# Patient Record
Sex: FEMALE | Race: WHITE | NOT HISPANIC OR LATINO
[De-identification: names, ages, dates, MRNs, and addresses within clinical notes are randomized per-mention and may not be internally consistent; named-entity substitution may affect disease eponyms.]

---

## 2024-07-26 ENCOUNTER — TRANSCRIPTION ENCOUNTER (OUTPATIENT)
Age: 64
End: 2024-07-26

## 2024-07-26 ENCOUNTER — APPOINTMENT (OUTPATIENT)
Dept: ORTHOPEDIC SURGERY | Facility: CLINIC | Age: 64
End: 2024-07-26
Payer: COMMERCIAL

## 2024-07-26 VITALS
SYSTOLIC BLOOD PRESSURE: 136 MMHG | BODY MASS INDEX: 38.98 KG/M2 | HEART RATE: 85 BPM | DIASTOLIC BLOOD PRESSURE: 77 MMHG | WEIGHT: 220 LBS | HEIGHT: 63 IN

## 2024-07-26 DIAGNOSIS — M47.816 SPONDYLOSIS W/OUT MYELOPATHY OR RADICULOPATHY, LUMBAR REGION: ICD-10-CM

## 2024-07-26 DIAGNOSIS — M23.92 UNSPECIFIED INTERNAL DERANGEMENT OF LEFT KNEE: ICD-10-CM

## 2024-07-26 DIAGNOSIS — Z78.9 OTHER SPECIFIED HEALTH STATUS: ICD-10-CM

## 2024-07-26 PROBLEM — Z00.00 ENCOUNTER FOR PREVENTIVE HEALTH EXAMINATION: Status: ACTIVE | Noted: 2024-07-26

## 2024-07-26 PROCEDURE — 99204 OFFICE O/P NEW MOD 45 MIN: CPT | Mod: 25

## 2024-07-26 PROCEDURE — 72100 X-RAY EXAM L-S SPINE 2/3 VWS: CPT

## 2024-07-26 RX ORDER — ATORVASTATIN CALCIUM 20 MG/1
20 TABLET, FILM COATED ORAL
Refills: 0 | Status: ACTIVE | COMMUNITY

## 2024-07-26 RX ORDER — LISINOPRIL 5 MG/1
5 TABLET ORAL
Refills: 0 | Status: ACTIVE | COMMUNITY

## 2024-07-26 NOTE — DISCUSSION/SUMMARY
[de-identified] : Very pleasant 64-year-old female with approximately 10 months of left posterior knee pain she can directly pinpoint her pain to the distal hamstring she has been to 2 previous knee specialist who states that this is her back that is causing this pain and discomfort she has failed a course of physical therapy Celebrex oral steroids and a cortisone injection in the knee she presented with an MRI today that shows absolutely no evidence of lumbar stenosis lateral recess or severe foraminal encroachment and also clinically I do not think this is a radicular component I think this is a muscle musculotendinous issue of the distal hamstring and/or proximal gastrocsoleus complex she also states she has a history of being severe degenerative joint disease of the knee so I think this is more of a combination of internal derangement muscle tendon type issues versus a neurologic spine issue based upon 10 months of worsening knee pain and no further care by this to knee specialist knee MRI scan to be ordered.  Patient will follow-up for further clinical correlation.  At next clinical assessment would like to obtain a left knee x-ray

## 2024-07-26 NOTE — HISTORY OF PRESENT ILLNESS
[de-identified] : Very pleasant 64-year-old female presents for spine surgery evaluation.  She has been to 2 orthopedic surgeons knee specialist that are telling her that her focal posterior right knee pain is coming from her back and not her knee.  She presents with a lumbar MRI for review.  She states she has a history of knee degenerative joint disease bone-on-bone this has been going on approximately 10 months no trauma she has had physical therapy oral steroids anti-inflammatories as well as drawl Dosepak.  She isolates the pain primarily to the lateral distal hamstring insertion/proximal gastrocsoleus insertion.  Also to the middle aspect of her knee as well. [Worsening] : worsening [___ mths] : [unfilled] month(s) ago [3] : an average pain level of 3/10 [0] : a minimum pain level of 0/10 [10] : a maximum pain level of 10/10 [Intermit.] : ~He/She~ states the symptoms seem to be intermittent [Standing] : worsened by standing [Walking] : worsened by walking [Weight Bearing] : worsened by weight bearing [NSAIDs] : relieved by nonsteroidal anti-inflammatory drugs [Physical Therapy] : relieved by physical therapy [Rest] : relieved by rest [Ataxia] : no ataxia [Incontinence] : no incontinence [Loss of Dexterity] : good dexterity [Urinary Ret.] : no urinary retention

## 2024-07-26 NOTE — PHYSICAL EXAM
[Antalgic] : antalgic [de-identified] : CONSTITUTIONAL: The patient is a very pleasant individual who is well-nourished and who appears stated age. PSYCHIATRIC: The patient is alert and oriented X 3 and in no apparent distress, and participates with orthopedic evaluation well. HEAD: Atraumatic and is nonsyndromic in appearance. EENT: No visible thyromegaly, EOMI. RESPIRATORY: Respiratory rate is regular, not dyspneic on examination. LYMPHATICS: There is no inguinal lymphadenopathy INTEGUMENTARY: Skin is clean, dry, and intact about the bilateral lower extremities and lumbar spine. VASCULAR: There is brisk capillary refill about the bilateral lower extremities. NEUROLOGIC: There are no pathologic reflexes. There is no decrease in sensation of the bilateral lower extremities on manual  examination. Deep tendon reflexes are well maintained at 2+/4 of the bilateral lower extremities and are symmetric.. MUSCULOSKELETAL: There is no visible muscular atrophy. Manual motor strength is well maintained in the bilateral lower extremities. Range of motion of lumbar spine is well maintained. The patient ambulates in a non-myelopathic manner. Negative tension sign and straight leg raise bilaterally. Quad extension, ankle dorsiflexion, EHL, plantar flexion, and ankle eversion are well preserved. Normal secondary orthopaedic exam of bilateral hips, greater trochanteric area, right knee and ankles.  Left knee demonstrates internal derangement characteristics consistent with meniscal pathology there is a mild joint effusion and there is reproducible pain with palpation to the lateral more so than the medial distal gastrocsoleus complex as well as distal hamstring. [de-identified] : X-rays of the lumbar spine have been reviewed on today's date shows well-maintained lordosis to spaces are maintained no acute osseous abnormalities.  MRI of the lumbar spine from [facility] dated June 2024 shows very mild lumbar spondylosis no central lateral recess or foraminal stenosis

## 2024-08-16 ENCOUNTER — APPOINTMENT (OUTPATIENT)
Dept: ORTHOPEDIC SURGERY | Facility: CLINIC | Age: 64
End: 2024-08-16
Payer: COMMERCIAL

## 2024-08-16 VITALS
HEIGHT: 63 IN | WEIGHT: 214 LBS | DIASTOLIC BLOOD PRESSURE: 76 MMHG | SYSTOLIC BLOOD PRESSURE: 124 MMHG | BODY MASS INDEX: 37.92 KG/M2 | HEART RATE: 83 BPM

## 2024-08-16 DIAGNOSIS — M23.92 UNSPECIFIED INTERNAL DERANGEMENT OF LEFT KNEE: ICD-10-CM

## 2024-08-16 DIAGNOSIS — M47.816 SPONDYLOSIS W/OUT MYELOPATHY OR RADICULOPATHY, LUMBAR REGION: ICD-10-CM

## 2024-08-16 PROCEDURE — 73560 X-RAY EXAM OF KNEE 1 OR 2: CPT | Mod: LT

## 2024-08-16 PROCEDURE — 99214 OFFICE O/P EST MOD 30 MIN: CPT | Mod: 25

## 2024-08-17 NOTE — HISTORY OF PRESENT ILLNESS
[de-identified] : 64-year-old female is here for follow-up of low back pain, posterior right knee pain.She states she has a history of knee degenerative joint disease bone-on-bone this has been going on approximately 10 months no trauma she has had physical therapy oral steroids anti-inflammatories as well as drawl Dosepak. She isolates the pain primarily to the lateral distal hamstring insertion/proximal gastrocsoleus insertion. Also to the middle aspect of her knee as well.Modifying factors - worsened by standing, worsened by walking and worsened by weight bearing. Relieving factors include relieved by nonsteroidal anti-inflammatory drugs, relieved by physical therapy and relieved by rest.   Associated Symptoms: no ataxia, no incontinence, good dexterity and no urinary retention.   [Worsening] : worsening [___ yrs] : [unfilled] year(s) ago [10] : a maximum pain level of 10/10 [Standing] : standing [Walking] : worsened by walking [Weight Bearing] : worsened by weight bearing [Rest] : relieved by rest [Ataxia] : no ataxia [Loss of Dexterity] : good dexterity [Urinary Ret.] : no urinary retention

## 2024-08-17 NOTE — DISCUSSION/SUMMARY
[de-identified] : Patient presents for review of the knee MRI and a knee x-ray that shows I believe moderate to severe knee degenerative joint disease.  With her clinical exam that shows pain posteriorly to the knee as well as the distal one third of the hamstring I think this is more of knee degenerative joint disease combined with internal derangements of the meniscus and ACL and I believe arthritic knee does develop some aspect of a contracture I think this contracture set her up for hamstring contracture as well and possible distal hamstring sprain and strain I think following up with our knee service here at [facility] would be ideal because I do think she is clarence benefit from a total knee replacement.  I very reviewed her MRI that does show mild L4-5 lateral recess stenosis she is can be referred to physical medicine and rehabilitation for consideration of an injection.  I think the knee is more of an issue at this point in time she will be followed up on intermittent basis to monitor her spine component.

## 2024-08-17 NOTE — PHYSICAL EXAM
[Antalgic] : antalgic [de-identified] : CONSTITUTIONAL: The patient is a very pleasant individual who is well-nourished and who appears stated age. PSYCHIATRIC: The patient is alert and oriented X 3 and in no apparent distress, and participates with orthopedic evaluation well. HEAD: Atraumatic and is nonsyndromic in appearance. EENT: No visible thyromegaly, EOMI. RESPIRATORY: Respiratory rate is regular, not dyspneic on examination. LYMPHATICS: There is no inguinal lymphadenopathy INTEGUMENTARY: Skin is clean, dry, and intact about the bilateral lower extremities and lumbar spine. VASCULAR: There is brisk capillary refill about the bilateral lower extremities. NEUROLOGIC: There are no pathologic reflexes. There is no decrease in sensation of the bilateral lower extremities on manual  examination. Deep tendon reflexes are well maintained at 2+/4 of the bilateral lower extremities and are symmetric.. MUSCULOSKELETAL: There is no visible muscular atrophy. Manual motor strength is well maintained in the bilateral lower extremities. Range of motion of lumbar spine is well maintained. The patient ambulates in a non-myelopathic manner. Negative tension sign and straight leg raise bilaterally. Quad extension, ankle dorsiflexion, EHL, plantar flexion, and ankle eversion are well preserved. Normal secondary orthopaedic exam of bilateral hips, greater trochanteric area,right knee and ankles.  Left knee showing internal derangement characteristics associated with soft tissue as well as bone pathology/degenerative joint disease [de-identified] : X-rays from today's date August 16, 2024 of the left knee shows severe knee degenerative joint disease. MRI of the left knee has been reviewed showing moderate to severe degenerative joint disease partially see tear ACL tearing soft tissue components consistent with internal derangement/arthritic changes.  Previous lumbar MRI has been reviewed showing age-appropriate lumbar spondylosis no significant stenosis is noted.  Previous lumbar MRI from [facility] has been reviewed again showing no significant central lateral recess or foraminal stenosis

## 2024-08-17 NOTE — DISCUSSION/SUMMARY
[de-identified] : Patient presents for review of the knee MRI and a knee x-ray that shows I believe moderate to severe knee degenerative joint disease.  With her clinical exam that shows pain posteriorly to the knee as well as the distal one third of the hamstring I think this is more of knee degenerative joint disease combined with internal derangements of the meniscus and ACL and I believe arthritic knee does develop some aspect of a contracture I think this contracture set her up for hamstring contracture as well and possible distal hamstring sprain and strain I think following up with our knee service here at [facility] would be ideal because I do think she is clarence benefit from a total knee replacement.  I very reviewed her MRI that does show mild L4-5 lateral recess stenosis she is can be referred to physical medicine and rehabilitation for consideration of an injection.  I think the knee is more of an issue at this point in time she will be followed up on intermittent basis to monitor her spine component.

## 2024-08-17 NOTE — HISTORY OF PRESENT ILLNESS
[de-identified] : 64-year-old female is here for follow-up of low back pain, posterior right knee pain.She states she has a history of knee degenerative joint disease bone-on-bone this has been going on approximately 10 months no trauma she has had physical therapy oral steroids anti-inflammatories as well as drawl Dosepak. She isolates the pain primarily to the lateral distal hamstring insertion/proximal gastrocsoleus insertion. Also to the middle aspect of her knee as well.Modifying factors - worsened by standing, worsened by walking and worsened by weight bearing. Relieving factors include relieved by nonsteroidal anti-inflammatory drugs, relieved by physical therapy and relieved by rest.   Associated Symptoms: no ataxia, no incontinence, good dexterity and no urinary retention.   [Worsening] : worsening [___ yrs] : [unfilled] year(s) ago [10] : a maximum pain level of 10/10 [Standing] : standing [Walking] : worsened by walking [Weight Bearing] : worsened by weight bearing [Rest] : relieved by rest [Ataxia] : no ataxia [Loss of Dexterity] : good dexterity [Urinary Ret.] : no urinary retention

## 2024-08-17 NOTE — PHYSICAL EXAM
[Antalgic] : antalgic [de-identified] : CONSTITUTIONAL: The patient is a very pleasant individual who is well-nourished and who appears stated age. PSYCHIATRIC: The patient is alert and oriented X 3 and in no apparent distress, and participates with orthopedic evaluation well. HEAD: Atraumatic and is nonsyndromic in appearance. EENT: No visible thyromegaly, EOMI. RESPIRATORY: Respiratory rate is regular, not dyspneic on examination. LYMPHATICS: There is no inguinal lymphadenopathy INTEGUMENTARY: Skin is clean, dry, and intact about the bilateral lower extremities and lumbar spine. VASCULAR: There is brisk capillary refill about the bilateral lower extremities. NEUROLOGIC: There are no pathologic reflexes. There is no decrease in sensation of the bilateral lower extremities on manual  examination. Deep tendon reflexes are well maintained at 2+/4 of the bilateral lower extremities and are symmetric.. MUSCULOSKELETAL: There is no visible muscular atrophy. Manual motor strength is well maintained in the bilateral lower extremities. Range of motion of lumbar spine is well maintained. The patient ambulates in a non-myelopathic manner. Negative tension sign and straight leg raise bilaterally. Quad extension, ankle dorsiflexion, EHL, plantar flexion, and ankle eversion are well preserved. Normal secondary orthopaedic exam of bilateral hips, greater trochanteric area,right knee and ankles.  Left knee showing internal derangement characteristics associated with soft tissue as well as bone pathology/degenerative joint disease [de-identified] : X-rays from today's date August 16, 2024 of the left knee shows severe knee degenerative joint disease. MRI of the left knee has been reviewed showing moderate to severe degenerative joint disease partially see tear ACL tearing soft tissue components consistent with internal derangement/arthritic changes.  Previous lumbar MRI has been reviewed showing age-appropriate lumbar spondylosis no significant stenosis is noted.  Previous lumbar MRI from [facility] has been reviewed again showing no significant central lateral recess or foraminal stenosis

## 2024-09-04 ENCOUNTER — APPOINTMENT (OUTPATIENT)
Dept: ORTHOPEDIC SURGERY | Facility: CLINIC | Age: 64
End: 2024-09-04
Payer: COMMERCIAL

## 2024-09-04 VITALS
HEART RATE: 88 BPM | WEIGHT: 209 LBS | SYSTOLIC BLOOD PRESSURE: 116 MMHG | HEIGHT: 63 IN | DIASTOLIC BLOOD PRESSURE: 75 MMHG | BODY MASS INDEX: 37.03 KG/M2

## 2024-09-04 DIAGNOSIS — M17.12 UNILATERAL PRIMARY OSTEOARTHRITIS, LEFT KNEE: ICD-10-CM

## 2024-09-04 PROCEDURE — 99203 OFFICE O/P NEW LOW 30 MIN: CPT

## 2024-09-04 NOTE — DISCUSSION/SUMMARY
[Medication Risks Reviewed] : Medication risks reviewed [Surgical risks reviewed] : Surgical risks reviewed [PRN] : PRN [de-identified] : Patient is a 64 female with severe left knee osteoarthritis presenting today for initial evaluation.  We have thorough discussion about conservative surgical treatment options.  At this time she is not interested pursuing any type of surgical intervention.  She like continue with conservative treatment.  Will defer any cortisone injections she had minimal response to this in the past.  We discussed low-impact activity exercise.  Take Tylenol and NSAID as needed for the pain.  I will see her back on as-needed basis for her left knee.  All questions were asked and answered

## 2024-09-04 NOTE — ADDENDUM
[FreeTextEntry1] : This note was written by Kelly Leblanc, acting as the  for Dr. Spencer. This note accurately reflects the work and decisions made by Dr. Spencer.

## 2024-09-04 NOTE — HISTORY OF PRESENT ILLNESS
[Pain Location] : pain [] : left knee [Worsening] : worsening [___ mths] : [unfilled] month(s) ago [Constant] : ~He/She~ states the symptoms seem to be constant [Lifting] : worsened by lifting [Knee Flexion] : worsened with knee flexion [Knee Extension] : worsened with knee extension [NSAIDs] : relieved by nonsteroidal anti-inflammatory drugs [Physical Therapy] : relieved by physical therapy [de-identified] : 64 year old female presents to the office today for an initial visit for left knee pain since October of 2023. Pain initially was in the posterior aspect of the knee, radiating up and down leg. She was seen by another provider who told her the pain is sciatica. She went through a course of physical therapy and Cortisone injections which did not relieve pain, did not worsen though. No back pain, no numbness or tingling. She then went to get an MRI to further evaluate; however, patient notes she is having trouble ambulating stairs, lifting her grandchildren and movements similar. The patient denies any falls or trauma. Patient ambulates with no assisting devices. No constitutional symptoms noted.  [de-identified] : heavy lifting, ambulating stairs [de-identified] : Cortisone

## 2024-09-04 NOTE — PHYSICAL EXAM
[Normal] : Gait: normal [de-identified] : GENERAL APPEARANCE: Well nourished and hydrated, pleasant, alert, and oriented x 3. Appears their stated age. HEENT: Normocephalic, extraocular eye motion intact. Nasal septum midline. Oral cavity clear. External auditory canal clear. RESPIRATORY: Breath sounds clear and audible in all lobes. No wheezing, No accessory muscle use. CARDIOVASCULAR: No apparent abnormalities. No lower leg edema. No varicosities. Pedal pulses are palpable. NEUROLOGIC: Sensation is normal, no muscle weakness in the upper or lower extremities. DERMATOLOGIC: No apparent skin lesions, moist, warm, no rash. SPINE: Cervical spine appears normal and moves freely; thoracic spine appears normal and moves freely; lumbosacral spine appears normal and moves freely, normal, nontender. MUSCULOSKELETAL: Hands, wrists, and elbows are normal and move freely, shoulders are normal and move freely. Psychiatric: Oriented to person, place, and time, insight and judgement were intact and the affect was normal. [de-identified] : Left knee exam shows mild effusion, ROM is 0-120 degrees, no instability, no pain with Yaneth, medial and lateral joint line tenderness. 5/5 motor strength in bilateral lower extremities. Sensory: Intact in bilateral lower extremities. DTRs: Biceps, brachioradialis, triceps, patellar, ankle and plantar 2+ and symmetric bilaterally. Pulses: dorsalis pedis, posterior tibial, femoral, popliteal, and radial 2+ and symmetric bilaterally. [de-identified] : 4 views of the left knee obtained the office today show no acute fracture or dislocation. MRI LT [id number] IMPRESSION: -Chronic appearing low-grade partial thickness ACL tear with associated mucoid degeneration. -Complex degenerative appearing macerated medial meniscal tear with peripheral extrusion of meniscal tissue -Small knee joint effusion with synovitis -Severe medial compartment DJD  3 views of the left knee obtained previously show no acute fracture or dislocation.  There is severe medial joint space narrowing bone osteoarthritis tricompartmental degenerative changes consistent with Kellgren-Javier grade 4 changes

## 2025-09-03 ENCOUNTER — APPOINTMENT (OUTPATIENT)
Dept: ORTHOPEDIC SURGERY | Facility: CLINIC | Age: 65
End: 2025-09-03
Payer: MEDICARE

## 2025-09-03 VITALS
BODY MASS INDEX: 34.55 KG/M2 | WEIGHT: 195 LBS | DIASTOLIC BLOOD PRESSURE: 70 MMHG | HEIGHT: 63 IN | HEART RATE: 80 BPM | TEMPERATURE: 98.1 F | SYSTOLIC BLOOD PRESSURE: 112 MMHG

## 2025-09-03 DIAGNOSIS — M25.562 PAIN IN LEFT KNEE: ICD-10-CM

## 2025-09-03 DIAGNOSIS — M17.12 UNILATERAL PRIMARY OSTEOARTHRITIS, LEFT KNEE: ICD-10-CM

## 2025-09-03 PROCEDURE — 73564 X-RAY EXAM KNEE 4 OR MORE: CPT | Mod: LT

## 2025-09-03 PROCEDURE — 99204 OFFICE O/P NEW MOD 45 MIN: CPT | Mod: 25

## 2025-09-04 RX ORDER — HYALURONATE SODIUM 20 MG/2 ML
20 SYRINGE (ML) INTRAARTICULAR
Qty: 1 | Refills: 0 | Status: DISCONTINUED | OUTPATIENT
Start: 2025-09-03 | End: 2025-09-04

## 2025-09-10 RX ORDER — HYALURONATE SODIUM 30 MG/2 ML
30 SYRINGE (ML) INTRAARTICULAR
Qty: 3 | Refills: 0 | Status: ACTIVE | OUTPATIENT
Start: 2025-09-04